# Patient Record
Sex: MALE | Race: WHITE
[De-identification: names, ages, dates, MRNs, and addresses within clinical notes are randomized per-mention and may not be internally consistent; named-entity substitution may affect disease eponyms.]

---

## 2019-02-12 ENCOUNTER — HOSPITAL ENCOUNTER (EMERGENCY)
Dept: HOSPITAL 11 - JP.ED | Age: 59
Discharge: HOME | End: 2019-02-12
Payer: MEDICAID

## 2019-02-12 DIAGNOSIS — S92.001A: Primary | ICD-10-CM

## 2019-02-12 DIAGNOSIS — Z79.899: ICD-10-CM

## 2019-02-12 DIAGNOSIS — F32.9: ICD-10-CM

## 2019-02-12 DIAGNOSIS — Z91.030: ICD-10-CM
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
## 2019-02-12 PROCEDURE — 99284 EMERGENCY DEPT VISIT MOD MDM: CPT

## 2019-02-12 PROCEDURE — 73610 X-RAY EXAM OF ANKLE: CPT

## 2019-02-12 PROCEDURE — 96372 THER/PROPH/DIAG INJ SC/IM: CPT

## 2019-02-12 NOTE — EDM.PDOC
ED HPI GENERAL MEDICAL PROBLEM





- General


Chief Complaint: Lower Extremity Injury/Pain


Stated Complaint: FELL OFF THE ROOF SHOVELING


Time Seen by Provider: 02/12/19 16:33


Source of Information: Reports: Patient, Family, RN Notes Reviewed


History Limitations: Reports: No Limitations





- History of Present Illness


INITIAL COMMENTS - FREE TEXT/NARRATIVE: 





58-year-old gentleman presents emergency department today after a fall this was 

at home estimate 10 foot landed on concrete happened about 2 hours prior he 

landed on his feet he is complaining of right ankle pain he cannot bear weight 

no other complaints at this time


  ** Right Wrist


Pain Score (Numeric/FACES): 5





- Related Data


 Allergies











Allergy/AdvReac Type Severity Reaction Status Date / Time


 


venom-honey bee Allergy  UNKNOWN Verified 02/12/19 16:23





[bee venom (honey bee)]     











Home Meds: 


 Home Meds





FLUoxetine HCl [Fluoxetine] 40 mg PO DAILY 02/12/19 [History]











Past Medical History


HEENT History: Reports: Impaired Vision


Psychiatric History: Reports: Depression





- Infectious Disease History


Infectious Disease History: Reports: Chicken Pox, Measles, Mumps





- Past Surgical History


Head Surgeries/Procedures: Reports: None


HEENT Surgical History: Reports: None


Musculoskeletal Surgical History: Reports: Other (See Below)


Other Musculoskeletal Surgeries/Procedures:: rt thumb surgery and left foot





Social & Family History





- Tobacco Use


Smoking Status *Q: Never Smoker


Second Hand Smoke Exposure: No





- Caffeine Use


Caffeine Use: Reports: Coffee





- Recreational Drug Use


Recreational Drug Use: No





Review of Systems





- Review of Systems


Review Of Systems: See Below


Musculoskeletal: Reports: Joint Pain (Ankle pain)


Skin: Reports: No Symptoms


Neurological: Reports: No Symptoms





ED EXAM, GENERAL





- Physical Exam


Exam: See Below


Free Text/Narrative:: 





Examination of the right ankle I do appreciate a deformity there is some edema 

noted as well he is exquisitely tender to touch in this area is no tenderness 

at the knee no tenderness at the hip pedal pulse is +2 he has limited range of 

motion digits secondary to pain sensation is intact


Exam Limited By: No Limitations


General Appearance: Alert, WD/WN, No Apparent Distress


Respiratory/Chest: No Respiratory Distress





Course





- Vital Signs


Last Recorded V/S: 


 Last Vital Signs











Temp  98.2 F   02/12/19 16:23


 


Pulse  68   02/12/19 16:23


 


Resp  16   02/12/19 16:23


 


BP  111/63   02/12/19 16:23


 


Pulse Ox  97   02/12/19 16:23














- Orders/Labs/Meds


Orders: 


 Active Orders 24 hr











 Category Date Time Status


 


 Ankle Min 3V Rt [CR] Stat Exams  02/12/19 16:36 Taken











Meds: 


Medications














Discontinued Medications














Generic Name Dose Route Start Last Admin





  Trade Name Clara  PRN Reason Stop Dose Admin


 


Fentanyl  50 mcg  02/12/19 16:36  02/12/19 16:42





  Sublimaze  IM  02/12/19 16:37  50 mcg





  ONETIME ONE   Administration





     





     





     





     














Departure





- Departure


Time of Disposition: 17:21


Disposition: Home, Self-Care 01


Condition: Fair


Clinical Impression: 


Right calcaneal fracture


Qualifiers:


 Encounter type: initial encounter Calcaneus location: unspecified portion of 

calcaneus Fracture type: closed Fracture alignment: nondisplaced Qualified Code(

s): S92.001A - Unspecified fracture of right calcaneus, initial encounter for 

closed fracture








- Discharge Information


Referrals: 


Haris Wang NP [Primary Care Provider] - 


Forms:  ED Department Discharge


Additional Instructions: 


Please remain in the boot and crutches nonweightbearing, use hydrocodone as 

needed for pain control, please follow-up with orthopedics





- My Orders


Last 24 Hours: 


My Active Orders





02/12/19 16:36


Ankle Min 3V Rt [CR] Stat 














- Assessment/Plan


Last 24 Hours: 


My Active Orders





02/12/19 16:36


Ankle Min 3V Rt [CR] Stat 











Plan: 





Assessment





Acuity = acute





Site and laterality = right calcaneus fracture  





Etiology  = secondary to fall 10 feet  





Manifestations = none  





Location of injury =  Home





Lab values = radiology read suspicious for calcaneus fracture  





Plan


he is placed in a walking boot and crutches with an appointment with 

orthopedics the next couple days, hydrocodone 5/325 one tab by mouth every 6 

hours when necessary for pain total #10  

















 This note was dictated using dragon voice recognition software please call 

with any questions on syntax or grammar.

## 2019-02-15 NOTE — CRLCR
Final Report: 

Indication:

Fall. Pain.



Technique:

Three views of the right ankle were obtained.



Comparison:

None 



Findings:

A suspected calcaneal fracture is identified. A plantar calcaneal spur is 
identified. Ankle mortise intact. Talar dome is intact.



Impression:

Suspected calcaneal fracture.





Dictated by Rosio Ruby MD @ Feb 12 2019 5:11PM

(Electronic Signature)
MTDD

## 2019-02-25 ENCOUNTER — HOSPITAL ENCOUNTER (OUTPATIENT)
Dept: HOSPITAL 11 - JP.SDS | Age: 59
Discharge: HOME | End: 2019-02-25
Attending: ORTHOPAEDIC SURGERY
Payer: MEDICAID

## 2019-02-25 DIAGNOSIS — S92.061A: Primary | ICD-10-CM
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
## 2019-02-25 DIAGNOSIS — Z91.030: ICD-10-CM

## 2019-02-25 PROCEDURE — C1776 JOINT DEVICE (IMPLANTABLE): HCPCS

## 2019-02-25 PROCEDURE — 80053 COMPREHEN METABOLIC PANEL: CPT

## 2019-02-25 PROCEDURE — 85027 COMPLETE CBC AUTOMATED: CPT

## 2019-02-25 PROCEDURE — C1713 ANCHOR/SCREW BN/BN,TIS/BN: HCPCS

## 2019-02-25 PROCEDURE — 28415 OPTX CALCANEAL FRACTURE: CPT

## 2019-02-25 PROCEDURE — 36415 COLL VENOUS BLD VENIPUNCTURE: CPT

## 2019-03-07 NOTE — PCM.OPNOTE
- General Post-Op/Procedure Note


Date of Surgery/Procedure: 02/25/19


Operative Procedure(s): Open reduction and internal fixation right calcaneus


Findings: 





Comminuted, displaced, intra-articular right calcaneus fracture


Pre Op Diagnosis: Comminuted , displaced right calcaneus fracture


Post-Op Diagnosis: Same


Anesthesia Technique: General ET Tube


Primary Surgeon: Chente HINOJOSA in mLs: 20


Complications: None


Condition: Good


Free Text/Narrative:: 





Indications: Mr. Saunders is a 58-year-old gentleman who sustained an injury to his 

left foot in a fall from his roof. X-rays and CT scan reveal a displaced intra-

articular fracture of the right calcaneus. This was approximately 2 weeks ago. 

He has been working on elevation to minimize swelling. Now presents for open 

reduction and internal fixation. Swelling is down considerably and skin is in 

good condition for surgery. Risks, benefits and potential complications of the 

procedure were discussed and he agrees to proceed.





Procedure: After adequate anesthesia was obtained patient was placed supine 

with a bump under the right hip. The right foot and leg were prepped and draped 

in a sterile fashion. Leg was exsanguinated and tourniquet inflated to 250 mg 

of mercury pressure. Incision was made over the lateral aspect of the foot 

starting above the medial malleolus just anterior to the Achilles tendon. This 

was carried straight distally to just above the heel pad and curved along the 

line of the heel pad anteriorly. With minimal manipulation of the skin full-

thickness flap was then created down to the calcaneus. Standard approximately 

taking care to avoid the sural nerve. Section carried out over the fracture to 

the subtalar joint. A K wire was then placed up into the fibula and used to 

retract the flap to minimize handling. A second K wire was drilled into the 

talus and also used for retraction more anteriorly. A Schanz screw was placed 

into the calcaneus and the main body of the tuberosity. This was used to 

manipulate the fracture and start length.


The lateral calcaneal wall fragment was removed and placed on the back table. 

This allowed access to the articular surface. Relating the main body fragment 

with the Schanz screw and a Cardale elevator was placed into the fracture and 

used 2 maneuver the fragments into position restoring the medial wall. The 

lateral fragment was then cleared of hematoma and reduced into position. This 

was held temporarily with a K wire. A 4.0 screw was then placed over drilling 

the near cortex to provide lag compression across the articular surface. 

Reduction of the articular surface was confirmed by palpation with a Cardale. A 

Synthes calcaneal specialty plate was then selected. It was positioned over the 

lateral aspect and position confirmed using C-arm. This was temporarily held 

with a threaded pin. Additional fixation was then obtained with screws through 

the plate. These were placed in compression fashion with excellent purchase. 

Final position of the screws was evaluated with C-arm. One screw was proud 

medially and this was replaced with a shorter screw through a different screw 

hole in the plate. Final position was again confirmed.


Wound was then irrigated. 0 Vicryl sutures on pop-off needles were then used, 

placing multiple sutures through the periosteal tissue of the flap. Once all 

sutures had been placed these were then tied down in succession. Sutures were 

cut. Skin was then closed with 2-0 Vicryl and 3-0 nylon was used in mattress 

fashion for the skin. A well-padded AO plaster splint was then applied with the 

foot in neutral position. The patient tolerated the procedure well and there 

were no complications was taken from the operating room in stable condition.

## 2020-02-05 NOTE — CR
Fluoro Up To 1Hr

 

CLINICAL HISTORY: Hardware removal

 

FINDINGS: 3 fluoroscopic spot film images were obtained in the or for the use of

the surgeon

 

IMPRESSION: C-arm images as above

## 2020-02-17 NOTE — OR
DATE OF PROCEDURE:  02/05/2020

 

SURGEON:  Chente White MD

 

PREOPERATIVE DIAGNOSES:

1. Painful retained hardware, right foot.

2. Posttraumatic arthritis, subtalar joint, right foot.

 

POSTOPERATIVE DIAGNOSES:

1. Painful retained hardware, right foot.

2. Posttraumatic arthritis, subtalar joint, right foot.

 

PROCEDURE:  Removal of hardware, right calcaneus and arthrodesis, subtalar joint.

 

ANESTHESIA:  General.

 

INDICATIONS:  Harsh is a 59-year-old gentleman who sustained an intra-articular fracture of

the right calcaneus approximately 1 year ago.  He underwent open reduction and internal

fixation.  He went on to the heal the fracture, but has developed persistent pain and

evidence of posttraumatic collapse of the subtalar joint, particularly posteriorly.  He now

presents for removal of hardware and subtalar fusion.  Risks, benefits and potential

complications of the procedure were discussed.

 

DESCRIPTION OF PROCEDURE:  After adequate anesthesia was obtained, patient placed in the

lateral decubitus position and secured with a bean bag positioner.  All bony prominences

were well padded.  The right leg was then prepped and draped in a sterile fashion with a

tourniquet about the upper thigh.  Leg was exsanguinated and tourniquet inflated to 300 mmHg

pressure.  Previous incision was utilized in the lateral aspect of the hindfoot, carried

down full thickness to the bone and elevated as a full-thickness flap over the calcaneus.

Elevator used to expose the hardware.  The screws and plate were then removed without

difficulty.  Two K-wires were placed into the talus and used as retractors to avoid

excessive trauma to the skin flap.  Subtalar joint was identified, which did show fairly

significant collapse predominantly posteriorly.  Using a combination of curette and

osteotome, the remaining articular cartilage was removed and subchondral bone was exposed.

This was irrigated removing all articular fragments.  Bone surfaces were then cross-hatched

with an osteotome for better bleeding surfaces and increased surface area.  The joint was

then compressed and evaluated using fluoroscopy.  A small stab incision was made in the

posterior aspect of the heel.  A guide pin was then placed under fluoroscopic guidance

through the tuberosity of the calcaneus and up into the talar neck.  Position was confirmed

on AP and lateral images.  This was measured and then drilled.  A 6.5 mm cannulated

partially threaded screw was then placed over the guidewire compressing the subtalar joint.

Final position was confirmed using fluoroscopy.

Crushed cancellous allograft was then packed around the joint.  Wound was irrigated and then

closed in layers with 0 Vicryl in the deep layer, 2-0 Vicryl and a running 3-0 Monocryl.

Steri-Strips were applied.  A slight compressive dressing

was then applied with a posterior splint fiberglass.  The patient tolerated the procedure

very well.  There were no complications.  He was taken from the operating room in a stable

condition.

 

 

 

 

Chente White MD

DD:  02/17/2020 17:59:05

DT:  02/17/2020 21:45:16

Job #:  [id number]